# Patient Record
Sex: MALE
[De-identification: names, ages, dates, MRNs, and addresses within clinical notes are randomized per-mention and may not be internally consistent; named-entity substitution may affect disease eponyms.]

---

## 2021-12-01 ENCOUNTER — RX ONLY (OUTPATIENT)
Age: 56
Setting detail: RX ONLY
End: 2021-12-01

## 2022-08-02 ENCOUNTER — APPOINTMENT (RX ONLY)
Dept: URBAN - METROPOLITAN AREA CLINIC 114 | Facility: CLINIC | Age: 57
Setting detail: DERMATOLOGY
End: 2022-08-02

## 2022-08-02 VITALS — HEIGHT: 72 IN | WEIGHT: 215 LBS

## 2022-08-02 DIAGNOSIS — L82.1 OTHER SEBORRHEIC KERATOSIS: ICD-10-CM

## 2022-08-02 DIAGNOSIS — D22 MELANOCYTIC NEVI: ICD-10-CM

## 2022-08-02 DIAGNOSIS — L81.4 OTHER MELANIN HYPERPIGMENTATION: ICD-10-CM

## 2022-08-02 DIAGNOSIS — D18.0 HEMANGIOMA: ICD-10-CM

## 2022-08-02 DIAGNOSIS — L85.3 XEROSIS CUTIS: ICD-10-CM

## 2022-08-02 DIAGNOSIS — L40.0 PSORIASIS VULGARIS: ICD-10-CM

## 2022-08-02 DIAGNOSIS — L28.0 LICHEN SIMPLEX CHRONICUS: ICD-10-CM

## 2022-08-02 PROBLEM — D22.61 MELANOCYTIC NEVI OF RIGHT UPPER LIMB, INCLUDING SHOULDER: Status: ACTIVE | Noted: 2022-08-02

## 2022-08-02 PROBLEM — D18.01 HEMANGIOMA OF SKIN AND SUBCUTANEOUS TISSUE: Status: ACTIVE | Noted: 2022-08-02

## 2022-08-02 PROBLEM — D22.5 MELANOCYTIC NEVI OF TRUNK: Status: ACTIVE | Noted: 2022-08-02

## 2022-08-02 PROBLEM — D22.72 MELANOCYTIC NEVI OF LEFT LOWER LIMB, INCLUDING HIP: Status: ACTIVE | Noted: 2022-08-02

## 2022-08-02 PROBLEM — D22.62 MELANOCYTIC NEVI OF LEFT UPPER LIMB, INCLUDING SHOULDER: Status: ACTIVE | Noted: 2022-08-02

## 2022-08-02 PROBLEM — D22.71 MELANOCYTIC NEVI OF RIGHT LOWER LIMB, INCLUDING HIP: Status: ACTIVE | Noted: 2022-08-02

## 2022-08-02 PROCEDURE — ? SUNSCREEN RECOMMENDATIONS

## 2022-08-02 PROCEDURE — ? BIOPSY BY PUNCH METHOD

## 2022-08-02 PROCEDURE — 11104 PUNCH BX SKIN SINGLE LESION: CPT

## 2022-08-02 PROCEDURE — ? TREATMENT REGIMEN

## 2022-08-02 PROCEDURE — ? COUNSELING

## 2022-08-02 PROCEDURE — ? PRESCRIPTION

## 2022-08-02 PROCEDURE — ? PRESCRIPTION SAMPLES PROVIDED

## 2022-08-02 PROCEDURE — 99204 OFFICE O/P NEW MOD 45 MIN: CPT | Mod: 25

## 2022-08-02 RX ORDER — BETAMETHASONE DIPROPIONATE 0.5 MG/G
CREAM, AUGMENTED TOPICAL
Qty: 50 | Refills: 0 | Status: ERX | COMMUNITY
Start: 2022-08-02

## 2022-08-02 RX ADMIN — BETAMETHASONE DIPROPIONATE: 0.5 CREAM, AUGMENTED TOPICAL at 00:00

## 2022-08-02 ASSESSMENT — LOCATION DETAILED DESCRIPTION DERM
LOCATION DETAILED: LEFT ANTERIOR DISTAL THIGH
LOCATION DETAILED: SUPERIOR THORACIC SPINE
LOCATION DETAILED: RIGHT ANTERIOR PROXIMAL UPPER ARM
LOCATION DETAILED: LEFT ANTERIOR PROXIMAL THIGH
LOCATION DETAILED: LEFT MEDIAL UPPER BACK
LOCATION DETAILED: LEFT LATERAL DISTAL PRETIBIAL REGION
LOCATION DETAILED: LEFT ULNAR DORSAL HAND
LOCATION DETAILED: LEFT ANTECUBITAL SKIN
LOCATION DETAILED: RIGHT PROXIMAL LATERAL POSTERIOR UPPER ARM
LOCATION DETAILED: LEFT INFERIOR MEDIAL FOREHEAD
LOCATION DETAILED: PERIUMBILICAL SKIN
LOCATION DETAILED: EPIGASTRIC SKIN
LOCATION DETAILED: LEFT ANTERIOR PROXIMAL UPPER ARM
LOCATION DETAILED: RIGHT VENTRAL PROXIMAL FOREARM
LOCATION DETAILED: RIGHT RADIAL DORSAL HAND
LOCATION DETAILED: LEFT POSTERIOR SHOULDER
LOCATION DETAILED: RIGHT DISTAL LATERAL POSTERIOR UPPER ARM
LOCATION DETAILED: RIGHT SUPERIOR MEDIAL UPPER BACK
LOCATION DETAILED: RIGHT ANTERIOR DISTAL THIGH
LOCATION DETAILED: LEFT DISTAL PRETIBIAL REGION
LOCATION DETAILED: RIGHT ANTERIOR PROXIMAL THIGH
LOCATION DETAILED: RIGHT ANTERIOR DISTAL UPPER ARM
LOCATION DETAILED: RIGHT DISTAL PRETIBIAL REGION
LOCATION DETAILED: UPPER STERNUM
LOCATION DETAILED: RIGHT POSTERIOR SHOULDER
LOCATION DETAILED: MIDDLE STERNUM
LOCATION DETAILED: RIGHT MEDIAL SUPERIOR CHEST
LOCATION DETAILED: LEFT PROXIMAL LATERAL POSTERIOR UPPER ARM

## 2022-08-02 ASSESSMENT — LOCATION ZONE DERM
LOCATION ZONE: TRUNK
LOCATION ZONE: FACE
LOCATION ZONE: ARM
LOCATION ZONE: HAND
LOCATION ZONE: LEG

## 2022-08-02 ASSESSMENT — BSA PSORIASIS: % BODY COVERED IN PSORIASIS: 30

## 2022-08-02 ASSESSMENT — LOCATION SIMPLE DESCRIPTION DERM
LOCATION SIMPLE: LEFT THIGH
LOCATION SIMPLE: LEFT PRETIBIAL REGION
LOCATION SIMPLE: RIGHT SHOULDER
LOCATION SIMPLE: CHEST
LOCATION SIMPLE: RIGHT PRETIBIAL REGION
LOCATION SIMPLE: LEFT HAND
LOCATION SIMPLE: LEFT ELBOW
LOCATION SIMPLE: RIGHT UPPER ARM
LOCATION SIMPLE: LEFT UPPER BACK
LOCATION SIMPLE: LEFT UPPER ARM
LOCATION SIMPLE: RIGHT FOREARM
LOCATION SIMPLE: RIGHT HAND
LOCATION SIMPLE: RIGHT THIGH
LOCATION SIMPLE: RIGHT UPPER BACK
LOCATION SIMPLE: ABDOMEN
LOCATION SIMPLE: LEFT SHOULDER
LOCATION SIMPLE: LEFT FOREHEAD
LOCATION SIMPLE: UPPER BACK

## 2022-08-02 ASSESSMENT — PGA PSORIASIS: PGA PSORIASIS 2020: SEVERE

## 2022-08-02 NOTE — PROCEDURE: TREATMENT REGIMEN
Action 4: Continue
Initiate Regimen: betamethasone, augmented 0.05 % topical cream \\nQuantity: 50.0 g  Days Supply: 30\\nSig: Apply to extremities bid x 2 wks. Prn when flares
Detail Level: Simple
Detail Level: Zone

## 2022-08-02 NOTE — PROCEDURE: BIOPSY BY PUNCH METHOD
Detail Level: Detailed
Was A Bandage Applied: Yes
Punch Size In Mm: 4
Biopsy Type: H and E
Anesthesia Type: 1% lidocaine with epinephrine
Anesthesia Volume In Cc: 0.5
Additional Anesthesia Volume In Cc (Will Not Render If 0): 0
Hemostasis: None
Epidermal Sutures: 4-0 Nylon
Wound Care: Petrolatum
Dressing: bandage
Suture Removal: 7 days
Patient Will Remove Sutures At Home?: No
Lab: [id number]
Lab Facility: [id number]
Consent: Written consent was obtained and risks were reviewed including but not limited to scarring, infection, bleeding, scabbing, incomplete removal, nerve damage and allergy to anesthesia.
Post-Care Instructions: I reviewed with the patient in detail post-care instructions. Patient is to keep the biopsy site dry overnight, and then apply bacitracin twice daily until healed. Patient may apply hydrogen peroxide soaks to remove any crusting.
Home Suture Removal Text: Patient was provided a home suture removal kit and will remove their sutures at home.  If they have any questions or difficulties they will call the office.
Notification Instructions: Patient will be notified of biopsy results. However, patient instructed to call the office if not contacted within 2 weeks.
Billing Type: Third-Party Bill
Information: Selecting Yes will display possible errors in your note based on the variables you have selected. This validation is only offered as a suggestion for you. PLEASE NOTE THAT THE VALIDATION TEXT WILL BE REMOVED WHEN YOU FINALIZE YOUR NOTE. IF YOU WANT TO FAX A PRELIMINARY NOTE YOU WILL NEED TO TOGGLE THIS TO 'NO' IF YOU DO NOT WANT IT IN YOUR FAXED NOTE.

## 2022-08-02 NOTE — PROCEDURE: MIPS QUALITY
Quality 226: Preventive Care And Screening: Tobacco Use: Screening And Cessation Intervention: Patient screened for tobacco use and is an ex/non-smoker
Quality 110: Preventive Care And Screening: Influenza Immunization: Influenza Immunization Administered during Influenza season
Quality 128: Preventive Care And Screening: Body Mass Index (Bmi) Screening And Follow-Up Plan: BMI is documented within normal parameters and no follow-up plan is required.
Quality 130: Documentation Of Current Medications In The Medical Record: Current Medications Documented
Detail Level: Detailed

## 2022-08-08 ENCOUNTER — APPOINTMENT (RX ONLY)
Dept: URBAN - METROPOLITAN AREA CLINIC 111 | Facility: CLINIC | Age: 57
Setting detail: DERMATOLOGY
End: 2022-08-08

## 2022-08-08 ENCOUNTER — RX ONLY (OUTPATIENT)
Age: 57
Setting detail: RX ONLY
End: 2022-08-08

## 2022-08-08 DIAGNOSIS — L30.8 OTHER SPECIFIED DERMATITIS: ICD-10-CM

## 2022-08-08 DIAGNOSIS — Z48.02 ENCOUNTER FOR REMOVAL OF SUTURES: ICD-10-CM

## 2022-08-08 PROCEDURE — 99024 POSTOP FOLLOW-UP VISIT: CPT

## 2022-08-08 PROCEDURE — ? COUNSELING

## 2022-08-08 PROCEDURE — ? SUTURE REMOVAL (GLOBAL PERIOD)

## 2022-08-08 RX ORDER — TRIAMCINOLONE ACETONIDE 1 MG/G
CREAM TOPICAL
Qty: 454 | Refills: 3 | Status: ERX | COMMUNITY
Start: 2022-08-08

## 2022-08-08 ASSESSMENT — LOCATION DETAILED DESCRIPTION DERM
LOCATION DETAILED: LEFT POSTERIOR SHOULDER
LOCATION DETAILED: RIGHT PROXIMAL LATERAL POSTERIOR UPPER ARM
LOCATION DETAILED: RIGHT POSTERIOR SHOULDER

## 2022-08-08 ASSESSMENT — LOCATION SIMPLE DESCRIPTION DERM
LOCATION SIMPLE: LEFT SHOULDER
LOCATION SIMPLE: RIGHT UPPER ARM
LOCATION SIMPLE: RIGHT SHOULDER

## 2022-08-08 ASSESSMENT — LOCATION ZONE DERM: LOCATION ZONE: ARM

## 2022-08-08 NOTE — PROCEDURE: REASSURANCE
Hide Additional Notes?: No
Detail Level: Detailed
Additional Notes (Optional): Continue Augmented Betamethasone topical cream. Triamcinolone Prescription sent

## 2024-06-17 ENCOUNTER — TELEPHONE ENCOUNTER (OUTPATIENT)
Dept: URBAN - METROPOLITAN AREA CLINIC 64 | Facility: CLINIC | Age: 59
End: 2024-06-17

## 2024-08-09 ENCOUNTER — DASHBOARD ENCOUNTERS (OUTPATIENT)
Age: 59
End: 2024-08-09

## 2024-08-09 ENCOUNTER — OFFICE VISIT (OUTPATIENT)
Dept: URBAN - METROPOLITAN AREA CLINIC 57 | Facility: CLINIC | Age: 59
End: 2024-08-09
Payer: COMMERCIAL

## 2024-08-09 VITALS
DIASTOLIC BLOOD PRESSURE: 70 MMHG | BODY MASS INDEX: 30.07 KG/M2 | SYSTOLIC BLOOD PRESSURE: 120 MMHG | OXYGEN SATURATION: 99 % | WEIGHT: 214.8 LBS | HEIGHT: 71 IN | TEMPERATURE: 97.1 F | HEART RATE: 60 BPM

## 2024-08-09 DIAGNOSIS — K76.82 HEPATIC ENCEPHALOPATHY: ICD-10-CM

## 2024-08-09 DIAGNOSIS — I85.00 ESOPHAGEAL VARICES WITHOUT BLEEDING: ICD-10-CM

## 2024-08-09 DIAGNOSIS — Z86.010 PERSONAL HISTORY OF COLONIC POLYPS: ICD-10-CM

## 2024-08-09 DIAGNOSIS — K70.30 ALCOHOLIC CIRRHOSIS OF LIVER WITHOUT ASCITES: ICD-10-CM

## 2024-08-09 PROCEDURE — 99204 OFFICE O/P NEW MOD 45 MIN: CPT | Performed by: INTERNAL MEDICINE

## 2024-08-09 RX ORDER — PROPRANOLOL HYDROCHLORIDE 10 MG/1
TAKE 1 TABLET BY MOUTH TWICE A DAY TABLET ORAL
Qty: 180 EACH | Refills: 0 | Status: ACTIVE | COMMUNITY

## 2024-08-09 RX ORDER — RIFAXIMIN 550 MG/1
1 TABLET TABLET ORAL TWICE A DAY
Status: ACTIVE | COMMUNITY

## 2024-08-09 RX ORDER — SPIRONOLACTONE 25 MG/1
TAKE 1 TABLET BY MOUTH TWICE A DAY FOR 90 DAYS TABLET ORAL
Qty: 180 EACH | Refills: 1 | Status: ACTIVE | COMMUNITY

## 2024-08-09 RX ORDER — OMEPRAZOLE 40 MG/1
TAKE 1 CAPSULE BY MOUTH TWICE A DAY CAPSULE, DELAYED RELEASE ORAL
Qty: 180 EACH | Refills: 2 | Status: ACTIVE | COMMUNITY

## 2024-08-09 NOTE — HPI-TODAY'S VISIT:
Here to establish care for history of alcoholic cirrhosis. Recently moved from [place]. He was diagnosed with alcoholic cirrhosis about a year and a half ago when he was admitted with GI bleeding and confusion. He had a full workup, upper endoscopy showed gastric ulcers but no varices at that time. He has quit alcohol since he has been abstinent almost for more than a year. He did have trouble with hepatic encephalopathy, has been well-controlled on Xifaxan currently. No history of ascites reported, he does have some lower extremity edema for which she is on spironolactone 25 mg daily. No medical records available to review or confirm. Patient has not had any lab work done in almost a year. He was following up with his GI every 6 months. No other GI complaints reported.

## 2024-08-20 ENCOUNTER — LAB OUTSIDE AN ENCOUNTER (OUTPATIENT)
Dept: URBAN - METROPOLITAN AREA CLINIC 63 | Facility: CLINIC | Age: 59
End: 2024-08-20

## 2024-08-20 ENCOUNTER — TELEPHONE ENCOUNTER (OUTPATIENT)
Dept: URBAN - METROPOLITAN AREA CLINIC 57 | Facility: CLINIC | Age: 59
End: 2024-08-20

## 2024-08-21 ENCOUNTER — OFFICE VISIT (OUTPATIENT)
Dept: URBAN - METROPOLITAN AREA CLINIC 57 | Facility: CLINIC | Age: 59
End: 2024-08-21

## 2024-08-27 LAB
A/G RATIO: 1
ABSOLUTE BASOPHILS: 28
ABSOLUTE EOSINOPHILS: 38
ABSOLUTE LYMPHOCYTES: 1720
ABSOLUTE MONOCYTES: 536
ABSOLUTE NEUTROPHILS: 2378
AFP, SERUM, TUMOR MARKER: 4.8
ALBUMIN: 3.6
ALKALINE PHOSPHATASE: 121
ALT (SGPT): 27
AST (SGOT): 42
BASOPHILS: 0.6
BILIRUBIN, TOTAL: 1.6
BUN/CREATININE RATIO: (no result)
BUN: 9
CALCIUM: 9.3
CARBON DIOXIDE, TOTAL: 26
CHLORIDE: 100
COMMENT(S): (no result)
CREATININE: 0.8
EGFR: 102
EOSINOPHILS: 0.8
GLOBULIN, TOTAL: 3.7
GLUCOSE: 96
HEMATOCRIT: 37.1
HEMOGLOBIN: 12.9
INR: 1.1
LYMPHOCYTES: 36.6
MCH: 37.3
MCHC: 34.8
MCV: 107.2
MONOCYTES: 11.4
NEUTROPHILS: 50.6
PLATELET COUNT: (no result)
POTASSIUM: 4.6
PROTEIN, TOTAL: 7.3
PT: 11.8
RDW: 12.4
RED BLOOD CELL COUNT: 3.46
SODIUM: 132
WHITE BLOOD CELL COUNT: 4.7

## 2024-09-06 ENCOUNTER — OFFICE VISIT (OUTPATIENT)
Dept: URBAN - METROPOLITAN AREA CLINIC 57 | Facility: CLINIC | Age: 59
End: 2024-09-06
Payer: COMMERCIAL

## 2024-09-06 VITALS
WEIGHT: 207 LBS | OXYGEN SATURATION: 99 % | DIASTOLIC BLOOD PRESSURE: 82 MMHG | SYSTOLIC BLOOD PRESSURE: 120 MMHG | HEART RATE: 60 BPM | HEIGHT: 71 IN | BODY MASS INDEX: 28.98 KG/M2

## 2024-09-06 DIAGNOSIS — K76.82 HEPATIC ENCEPHALOPATHY: ICD-10-CM

## 2024-09-06 DIAGNOSIS — I85.00 ESOPHAGEAL VARICES WITHOUT BLEEDING: ICD-10-CM

## 2024-09-06 DIAGNOSIS — K70.30 ALCOHOLIC CIRRHOSIS OF LIVER WITHOUT ASCITES: ICD-10-CM

## 2024-09-06 DIAGNOSIS — Z86.010 PERSONAL HISTORY OF COLONIC POLYPS: ICD-10-CM

## 2024-09-06 PROCEDURE — 99214 OFFICE O/P EST MOD 30 MIN: CPT

## 2024-09-06 RX ORDER — OMEPRAZOLE 40 MG/1
TAKE 1 CAPSULE BY MOUTH TWICE A DAY CAPSULE, DELAYED RELEASE ORAL
Qty: 180 EACH | Refills: 2 | Status: ACTIVE | COMMUNITY

## 2024-09-06 RX ORDER — PROPRANOLOL HYDROCHLORIDE 10 MG/1
TAKE 1 TABLET BY MOUTH TWICE A DAY TABLET ORAL
Qty: 180 EACH | Refills: 0 | Status: ACTIVE | COMMUNITY

## 2024-09-06 RX ORDER — SPIRONOLACTONE 25 MG/1
TAKE 1 TABLET BY MOUTH TWICE A DAY FOR 90 DAYS TABLET ORAL
Qty: 180 EACH | Refills: 1 | Status: ACTIVE | COMMUNITY

## 2024-09-06 RX ORDER — RIFAXIMIN 550 MG/1
1 TABLET TABLET ORAL TWICE A DAY
Status: ACTIVE | COMMUNITY

## 2024-09-06 NOTE — HPI-PREVIOUS IMAGING
8/20/2024 MRI abdomen with and without contrast consistent with morphologic changes of cirrhosis and portal hypertension with large gastric varices arising from splenorenal shunt measuring 1.8 cm in diameter within the gastric fundal lumen. Recommend endovascular embolization with balloon retrograde transvenous obliteration of varices Diverticulosis of the colon without diverticulitis No focal hepatic lesion to suggest HCC. Continued surveillance recommended.  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 Large transmural gastric fundus variceal complex with shunt Diffuse bladder wall thickening and mild asymmetry anteriorly and cranially consider further evaluation with cystoscopy Diverticulosis

## 2024-09-06 NOTE — PHYSICAL EXAM GASTROINTESTINAL
Abdomen , soft, nontender, nondistended , no guarding or rigidity , no masses palpable , normal bowel sounds , Liver and Spleen, hepatosplenomegaly , liver nontender

## 2024-09-06 NOTE — HPI-PREVIOUS LABS
8/26/2024 AFP within normal limits at 4.8, CMP significant for sodium 132, total bilirubin 1.6, AST of 42 (creatinine 0.8, sodium 132, total bilirubin 1.6, INR 1.1) PT significant for 11.8, CBC significant for RBC 3.46, hemoglobin 12.9, hematocrit 37.1 MELD of 9 with 3-month mortality at 1.9%

## 2024-09-06 NOTE — PHYSICAL EXAM CONSTITUTIONAL:
well developed, well nourished , in no acute distress , ambulating without difficulty , normal communication ability, A&Ox4

## 2024-09-06 NOTE — HPI-PREVIOUS PROCEDURES
Colonoscopy 1/23/2023 ileum within normal limits, diverticulosis in sigmoid colon Nonbleeding external and internal hemorrhoids Unremarkable colonoscopy no biopsies taken recall for 10 years EGD January 2023 consistent with G1 EV, oozing duodenal ulcer treated with clip and grade 1 PHG.

## 2024-09-06 NOTE — PHYSICAL EXAM NEUROLOGIC:
oriented to person, place and time, short and long term memory intact, normal mood with an appropriate affect, cooperative with exam, negative asterixis

## 2024-09-06 NOTE — HPI-ZZZTODAY'S VISIT
Patient very pleasant 59-year-old male who presents for follow-up.  He is a patient of Dr. Mora.  Last seen 8/9/2024.  Past medical history significant for memory loss alcoholic cirrhosis without ascites, esophageal varices without bleeding, hepatic encephalopathy, personal history of colon polyps.  Family history noncontributory.  Last colonoscopy Last endoscopy At last office visit patient presented to establish care with history of alcoholic cirrhosis.  He was diagnosed in the beginning of 2023 when he was admitted with GI for bleeding and confusion.  During workup he had an upper endoscopy demonstrating gastric ulcer with no varices at that time.  Since this time he has abstained from alcohol use.  He is done well on Xifaxan with no further episodes of hepatic encephalopathy.  He has no history of ascites but does have some lower extremity edema for which he is on spironolactone 25 mg daily.  There are no medical records available for review of this information. Patient was previously on an inactive liver transplant list in [facility].  It was discussed that we may consider referring him to [facility] for further evaluation   If MELD greater than 15. Cornell presents with his wife for follow up. He is asympatomatic from a GI persepective. He denies dysphagia, dyspepsia, pyrosis, abdominal pain, change in bowel habits, unintentional weight loss, melena, or hematochezia. Patient denies jaundice, pruritus, change in mentation, increased confusion, abdominal distention, recent weight gain, or sleep disturbances.

## 2024-09-24 ENCOUNTER — P2P PATIENT RECORD (OUTPATIENT)
Age: 59
End: 2024-09-24

## 2024-09-24 ENCOUNTER — TELEPHONE ENCOUNTER (OUTPATIENT)
Dept: URBAN - METROPOLITAN AREA CLINIC 23 | Facility: CLINIC | Age: 59
End: 2024-09-24

## 2024-10-17 ENCOUNTER — WEB ENCOUNTER (OUTPATIENT)
Dept: URBAN - METROPOLITAN AREA CLINIC 57 | Facility: CLINIC | Age: 59
End: 2024-10-17

## 2024-11-27 ENCOUNTER — TELEPHONE ENCOUNTER (OUTPATIENT)
Dept: URBAN - METROPOLITAN AREA CLINIC 63 | Facility: CLINIC | Age: 59
End: 2024-11-27

## 2025-03-03 ENCOUNTER — OFFICE VISIT (OUTPATIENT)
Dept: URBAN - METROPOLITAN AREA CLINIC 57 | Facility: CLINIC | Age: 60
End: 2025-03-03

## 2025-03-06 ENCOUNTER — LAB OUTSIDE AN ENCOUNTER (OUTPATIENT)
Dept: URBAN - METROPOLITAN AREA CLINIC 57 | Facility: CLINIC | Age: 60
End: 2025-03-06